# Patient Record
Sex: FEMALE | Race: WHITE | Employment: UNEMPLOYED | URBAN - METROPOLITAN AREA
[De-identification: names, ages, dates, MRNs, and addresses within clinical notes are randomized per-mention and may not be internally consistent; named-entity substitution may affect disease eponyms.]

---

## 2020-04-30 LAB
C-REACTIVE PROTEIN: <0.3 MG/L (ref 0–5.1)
IGA: 208 MG/DL (ref 70–400)
VITAMIN B-12: 326 PG/ML (ref 211–911)

## 2020-05-01 LAB — TISSUE TRANSGLUTAMINASE IGA: <0.5 U/ML (ref 0–14)

## 2021-08-02 ENCOUNTER — OFFICE VISIT (OUTPATIENT)
Dept: FAMILY MEDICINE CLINIC | Age: 21
End: 2021-08-02

## 2021-08-02 VITALS — HEIGHT: 67 IN | WEIGHT: 129.2 LBS | BODY MASS INDEX: 20.28 KG/M2

## 2021-08-02 DIAGNOSIS — Z00.00 ANNUAL PHYSICAL EXAM: Primary | ICD-10-CM

## 2021-08-02 PROCEDURE — 99395 PREV VISIT EST AGE 18-39: CPT | Performed by: NURSE PRACTITIONER

## 2021-08-02 RX ORDER — CIPROFLOXACIN AND DEXAMETHASONE 3; 1 MG/ML; MG/ML
4 SUSPENSION/ DROPS AURICULAR (OTIC) 2 TIMES DAILY
Qty: 1 BOTTLE | Refills: 2 | Status: SHIPPED | OUTPATIENT
Start: 2021-08-02 | End: 2021-08-09

## 2021-08-02 RX ORDER — SACROSIDASE 8500 [IU]/ML
SOLUTION ORAL
COMMUNITY

## 2021-08-02 RX ORDER — LEVOTHYROXINE SODIUM 0.12 MG/1
125 TABLET ORAL DAILY
COMMUNITY

## 2021-08-02 ASSESSMENT — ENCOUNTER SYMPTOMS
STRIDOR: 0
SHORTNESS OF BREATH: 0
ABDOMINAL PAIN: 0
RHINORRHEA: 0
COUGH: 0
CHEST TIGHTNESS: 0
DIARRHEA: 0
EYE REDNESS: 0
BACK PAIN: 0
EYE DISCHARGE: 0
SINUS PAIN: 0
VOMITING: 0
SINUS PRESSURE: 0
ABDOMINAL DISTENTION: 0
EYE PAIN: 0
EYE ITCHING: 0
NAUSEA: 0
CONSTIPATION: 0
WHEEZING: 0
TROUBLE SWALLOWING: 0

## 2021-08-02 ASSESSMENT — PATIENT HEALTH QUESTIONNAIRE - PHQ9
SUM OF ALL RESPONSES TO PHQ9 QUESTIONS 1 & 2: 0
SUM OF ALL RESPONSES TO PHQ QUESTIONS 1-9: 0
2. FEELING DOWN, DEPRESSED OR HOPELESS: 0
1. LITTLE INTEREST OR PLEASURE IN DOING THINGS: 0
SUM OF ALL RESPONSES TO PHQ QUESTIONS 1-9: 0
SUM OF ALL RESPONSES TO PHQ QUESTIONS 1-9: 0

## 2021-08-02 NOTE — PROGRESS NOTES
2021    Karlo Parnell (:  2000) is a 24 y.o. female, here to establish care or for evaluation of the following medical concerns:    Rudell Opitz is a very pleasant 58-year-old female. She is a sophomore at [employer]. She has a history of hypothyroidism for which she is treated by Dr. Yael oRa at the [facility] endocrinology. She does have a history of inability to process sugars therefore is prescribed Sucraid also by Dr. Yael Roa. She has no complaints at this time just knew that she needed to get an adult doctor. She did not bring with her her vaccination records and will call us when she is able to find out if when she is due for her tetanus vaccination. She declines HPV. Review of Systems   Constitutional: Negative for chills, fatigue and fever. HENT: Negative for congestion, ear pain, hearing loss, rhinorrhea, sinus pressure, sinus pain, tinnitus and trouble swallowing. Eyes: Negative for pain, discharge, redness and itching. Respiratory: Negative for cough, chest tightness, shortness of breath, wheezing and stridor. Cardiovascular: Negative for chest pain, palpitations and leg swelling. Gastrointestinal: Negative for abdominal distention, abdominal pain, constipation, diarrhea, nausea and vomiting. Genitourinary: Negative for difficulty urinating, dysuria, hematuria and urgency. Musculoskeletal: Negative for back pain, joint swelling, myalgias and neck pain. Skin: Negative for rash and wound. Neurological: Negative for dizziness and headaches.        Current Outpatient Medications   Medication Sig Dispense Refill    levothyroxine (SYNTHROID) 125 MCG tablet Take 125 mcg by mouth Daily 1/2 tab daily      Sacrosidase (SUCRAID) 8500 UNIT/ML SOLN Take by mouth      ciprofloxacin-dexamethasone (CIPRODEX) 0.3-0.1 % otic suspension Place 4 drops into the right ear 2 times daily for 7 days 1 Bottle 2     No current facility-administered medications External ear normal.      Left Ear: External ear normal.      Nose: Nose normal.   Eyes:      General: No scleral icterus. Right eye: No discharge. Left eye: No discharge. Conjunctiva/sclera: Conjunctivae normal.      Pupils: Pupils are equal, round, and reactive to light. Neck:      Thyroid: No thyromegaly. Cardiovascular:      Rate and Rhythm: Normal rate and regular rhythm. Heart sounds: Normal heart sounds. No murmur heard. No friction rub. No gallop. Pulmonary:      Effort: Pulmonary effort is normal. No respiratory distress. Breath sounds: Normal breath sounds. No stridor. No wheezing or rales. Chest:      Chest wall: No tenderness. Abdominal:      General: Bowel sounds are normal. There is no distension. Palpations: Abdomen is soft. There is no mass. Tenderness: There is no abdominal tenderness. There is no guarding or rebound. Hernia: No hernia is present. Musculoskeletal:         General: No tenderness or deformity. Normal range of motion. Cervical back: Normal range of motion and neck supple. Lymphadenopathy:      Cervical: No cervical adenopathy. Skin:     General: Skin is warm and dry. Capillary Refill: Capillary refill takes less than 2 seconds. Coloration: Skin is not pale. Findings: No erythema or rash. Neurological:      Mental Status: She is alert and oriented to person, place, and time. Cranial Nerves: No cranial nerve deficit. Motor: No abnormal muscle tone. Coordination: Coordination normal.   Psychiatric:         Behavior: Behavior normal.         Thought Content:  Thought content normal.         Judgment: Judgment normal.         ASSESSMENT/PLAN:  Health Maintenance   Topic Date Due    DTaP/Tdap/Td vaccine (2 - Td or Tdap) 07/14/2021    HPV vaccine (1 - 2-dose series) 08/02/2022 (Originally 7/29/2010)    Cervical cancer screen  08/02/2022 (Originally 7/29/2020)    Chlamydia screen  08/02/2022 (Originally 7/29/2015)    Flu vaccine (1) 09/01/2021    COVID-19 Vaccine  Completed    Hepatitis A vaccine  Aged Out    Hepatitis B vaccine  Aged Out    Hib vaccine  Aged Out    Meningococcal (ACWY) vaccine  Aged Out    Pneumococcal 0-64 years Vaccine  Aged Out    Varicella vaccine  Discontinued    Hepatitis C screen  Discontinued    HIV screen  Discontinued        Diagnosis Orders   1. Annual physical exam     No needs identified at this time. Patient has not been nor is is not sexually active therefore gynecologic exam is deferred. Her annual labs are followed by endocrinology therefore no need to be done at this time. She is welcome to follow-up with me at any point in future if she needs she may contact me via [email or web address] while she is at school. No follow-ups on file. An  electronic signature was used to authenticate this note.   --YAJAIRA Ovalle - CNP on 8/2/2021 at 3:56 PM